# Patient Record
Sex: FEMALE | Race: OTHER | HISPANIC OR LATINO | URBAN - METROPOLITAN AREA
[De-identification: names, ages, dates, MRNs, and addresses within clinical notes are randomized per-mention and may not be internally consistent; named-entity substitution may affect disease eponyms.]

---

## 2022-08-28 ENCOUNTER — EMERGENCY (EMERGENCY)
Facility: HOSPITAL | Age: 57
LOS: 1 days | Discharge: HOME | End: 2022-08-28
Attending: EMERGENCY MEDICINE
Payer: COMMERCIAL

## 2022-08-28 VITALS — DIASTOLIC BLOOD PRESSURE: 87 MMHG | HEART RATE: 76 BPM | SYSTOLIC BLOOD PRESSURE: 129 MMHG | RESPIRATION RATE: 16 BRPM

## 2022-08-28 VITALS
HEIGHT: 63 IN | TEMPERATURE: 98 F | SYSTOLIC BLOOD PRESSURE: 147 MMHG | OXYGEN SATURATION: 100 % | WEIGHT: 207.9 LBS | DIASTOLIC BLOOD PRESSURE: 102 MMHG | HEART RATE: 90 BPM | RESPIRATION RATE: 18 BRPM

## 2022-08-28 PROCEDURE — 99283 EMERGENCY DEPT VISIT LOW MDM: CPT | Mod: 25

## 2022-08-28 PROCEDURE — 12001 RPR S/N/AX/GEN/TRNK 2.5CM/<: CPT

## 2022-08-28 RX ORDER — TETANUS TOXOID, REDUCED DIPHTHERIA TOXOID AND ACELLULAR PERTUSSIS VACCINE, ADSORBED 5; 2.5; 8; 8; 2.5 [IU]/.5ML; [IU]/.5ML; UG/.5ML; UG/.5ML; UG/.5ML
0.5 SUSPENSION INTRAMUSCULAR ONCE
Refills: 0 | Status: COMPLETED | OUTPATIENT
Start: 2022-08-28 | End: 2022-08-28

## 2022-08-28 RX ADMIN — TETANUS TOXOID, REDUCED DIPHTHERIA TOXOID AND ACELLULAR PERTUSSIS VACCINE, ADSORBED 0.5 MILLILITER(S): 5; 2.5; 8; 8; 2.5 SUSPENSION INTRAMUSCULAR at 19:37

## 2022-08-28 NOTE — ED PROVIDER NOTE - ATTENDING APP SHARED VISIT CONTRIBUTION OF CARE
I conducted a face-to-face interaction with patient and I agree with NP documentation and plan.  Pt presents w/R palm lac s/p fall  A/P:  Lac repaired, Adacel given

## 2022-08-28 NOTE — ED PROVIDER NOTE - PHYSICAL EXAMINATION
R wrist full range of motion without swelling or tenderness to palpation. No snuffbox tenderness to palpation.  R palm 1.5 linear laceration, bleeding stopped. All fingers full range of motion. No bony tenderness of the hand.

## 2022-08-28 NOTE — ED PROVIDER NOTE - CLINICAL SUMMARY MEDICAL DECISION MAKING FREE TEXT BOX
57 year-old s/p mechanical fall and R palm laceration. No bony tenderness to palpation. No snuffbox tenderness to palpation. PLan: lac repair, adacel, dc home.

## 2022-08-28 NOTE — ED PROVIDER NOTE - PATIENT PORTAL LINK FT
You can access the FollowMyHealth Patient Portal offered by [facility] by registering at the following website: [email or web address]. By joining [email or web address] FollowMyHealth portal, you will also be able to view your health information using other applications (apps) compatible with our system.

## 2022-08-28 NOTE — ED PROVIDER NOTE - NS ED ATTENDING STATEMENT MOD
This was a shared visit with the TRAMAINE. I reviewed and verified the documentation and independently performed the documented:

## 2022-08-28 NOTE — ED PROVIDER NOTE - NSFOLLOWUPINSTRUCTIONS_ED_ALL_ED_FT
Keep the dressing on the laceration site for 24-48 hours. Then you can remove the dressing. Wash area with soap and water and pad dry. Apply over the counter antibiotic cream (example: Bacitracin, Polysporin) to the area twice a day. Keep area uncovered and open to air. Cover it only if doing work that can get your wound dirty.  Suture removal in 7-10 days.  If you notice any increasing swelling, redness, increasing pain, fever, chills or foul drainage from the wound come back to the emergency room for re-evaluation.

## 2022-08-28 NOTE — ED PROVIDER NOTE - OBJECTIVE STATEMENT
57 year-old female, no significant PMHx, presents with R palm laceration s/p fall earlier today. Slipped in bathroom and landed on the R hand. Thinks that impact split the skin. Denies any bony pain of wrist or hand. Localized discomfort at the laceration site. Last tetanus immunization unknown. No head injury or any other complaints.

## 2022-08-28 NOTE — ED ADULT NURSE NOTE - OBJECTIVE STATEMENT
States she slipped and fell in the bathroom and accidentally cut right hand .Right hand palmar laceration sutured by Nreplock under steriltechnique ,DSD applied .

## 2023-10-01 NOTE — ED PROVIDER NOTE - PROGRESS NOTE DETAILS
Lac repaired. Adacel updated. Will dc with suture removal in 7-10 days. Pt is well appearing walking with steady gait, stable for discharge and follow up without fail with medical doctor. I had a detailed discussion with the patient and/or guardian regarding the historical points, exam findings, and any diagnostic results supporting the discharge diagnosis. Pt educated on care and need for follow up. Strict return instructions and red flag signs and symptoms discussed with patient. Questions answered. Pt shows understanding of discharge information and agrees to follow.
Meme Roberts